# Patient Record
Sex: MALE | Race: WHITE
[De-identification: names, ages, dates, MRNs, and addresses within clinical notes are randomized per-mention and may not be internally consistent; named-entity substitution may affect disease eponyms.]

---

## 2018-03-05 ENCOUNTER — HOSPITAL ENCOUNTER (EMERGENCY)
Dept: HOSPITAL 41 - JD.ED | Age: 38
Discharge: HOME | End: 2018-03-05
Payer: COMMERCIAL

## 2018-03-05 VITALS — SYSTOLIC BLOOD PRESSURE: 124 MMHG | DIASTOLIC BLOOD PRESSURE: 89 MMHG

## 2018-03-05 DIAGNOSIS — Z87.891: ICD-10-CM

## 2018-03-05 DIAGNOSIS — J20.8: Primary | ICD-10-CM

## 2018-03-05 NOTE — EDM.PDOC
ED HPI GENERAL MEDICAL PROBLEM





- General


Chief Complaint: Respiratory Problem


Stated Complaint: SOB


Time Seen by Provider: 03/05/18 17:37


Source of Information: Reports: Patient


History Limitations: Reports: No Limitations





- History of Present Illness


INITIAL COMMENTS - FREE TEXT/NARRATIVE: 





The patient states that he has had a dry cough since Friday, 3/2/2018, and 

chest tightness with wheezing since Saturday, 3/3/2018. He notices that his 

symptoms are worse if he is lying down, better if he is upright. He has not had 

a fever, nausea, vomiting, constipation, diarrhea, chest pain, palpitations, or 

urinary symptoms.





The patient states that he has been taking leftover Tessalon Perles, with no 

relief.





The patient states that he has had similar symptoms in the past, but does not 

recall what the diagnosis was.





The patient does not have a PCP.








  ** Chest


Pain Score (Numeric/FACES): 1





- Related Data


 Allergies











Allergy/AdvReac Type Severity Reaction Status Date / Time


 


No Known Allergies Allergy   Verified 03/05/18 17:36











Home Meds: 


 Home Meds





. [No Known Home Meds]  03/05/18 [History]











Past Medical History





- Past Surgical History


HEENT Surgical History: Reports: Tonsillectomy (x 2)


GI Surgical History: Reports: Appendectomy


Musculoskeletal Surgical History: Reports: Arthroscopic Knee (Right x 3, left x 

2)


Dermatological Surgical History: Reports: Other (See Below) (Left wrist cyst 

excision x 5)





Social & Family History





- Tobacco Use


Smoking Status *Q: Former Smoker


Years of Tobacco use: 15


Packs/Tins Daily: 2


Second Hand Smoke Exposure: No





- Alcohol Use


Alcohol Use History: Yes


Days Per Week of Alcohol Use: 1


Number of Drinks Per Day: 2


Total Drinks Per Week: 2


Alcohol Use Frequency: Socially





- Recreational Drug Use


Recreational Drug Use: No





- Living Situation & Occupation


Living situation: Reports: , with Spouse, with Family (2 kids)


Occupation: Employed (Construction)





ED ROS GENERAL





- Review of Systems


Review Of Systems: ROS reveals no pertinent complaints other than HPI.





ED EXAM, GENERAL





- Physical Exam


Exam: See Below


Exam Limited By: No Limitations


General Appearance: Alert, WD/WN, No Apparent Distress


Eye Exam: Bilateral Eye: Normal Inspection


Ears: Normal External Exam, Hearing Grossly Normal


Nose: Normal Inspection, No Blood


Throat/Mouth: Normal Inspection, Normal Lips, Normal Voice, No Airway Compromise


Head: Atraumatic, Normocephalic


Neck: Normal Inspection, Full Range of Motion


Respiratory/Chest: No Respiratory Distress, No Accessory Muscle Use, Wheezing (

Expiratory, more pronounced with the patient supine, less pronounced with the 

patient upright).  No: Crackles, Rhonchi


Cardiovascular: Normal Peripheral Pulses, Regular Rate, Rhythm, No Gallop, No 

JVD, No Murmur, No Rub


Peripheral Pulses: 4+: Radial (L), Radial (R)


GI/Abdominal: Normal Bowel Sounds, Soft, Non-Tender, No Organomegaly, No 

Distention, No Abnormal Bruit, No Mass


 (Male) Exam: Deferred


Rectal (Males) Exam: Deferred


Back Exam: Normal Inspection, Full Range of Motion, NT


Extremities: Normal Inspection, Normal Range of Motion, No Pedal Edema, Normal 

Capillary Refill


Neurological: Alert, Oriented, Normal Cognition, No Motor/Sensory Deficits


Psychiatric: Normal Affect


Skin Exam: Warm, Dry, Intact, Normal Color, No Rash





Course





- Vital Signs


Last Recorded V/S: 





 Last Vital Signs











Temp  36.1 C   03/05/18 17:37


 


Pulse  88   03/05/18 17:37


 


Resp  18   03/05/18 17:37


 


BP  124/89   03/05/18 17:37


 


Pulse Ox  98   03/05/18 17:37














- Orders/Labs/Meds


Orders: 





 Active Orders 24 hr











 Category Date Time Status


 


 Chest 2V [CR] Stat Exams  03/05/18 18:11 Ordered














- Re-Assessments/Exams


Free Text/Narrative Re-Assessment/Exam: 





03/05/18 18:25


Two-view chest radiograph appears to be grossly normal.  Cardiac silhouette is 

within normal limits.  No pulmonary vascular congestion.  No pleural effusions.

  No focal infiltrate.  No pneumothorax.  Formal read per the Radiologist 

pending.








03/05/18 18:30


Chest x-ray results discussed with the patient. Clinically, he has acute viral 

bronchitis. I explained that, unfortunately, there are no medicines to treat 

bronchitis. Albuterol is only recommended to treat exacerbations of underlying 

lung disease, such as asthma or COPD, as a result of bronchitis, but not to 

treat bronchitis itself, and, in this case, the patient does not have 

underlying lung disease. Antitussive medications are not effective in the 

treatment of bronchitis.





Departure





- Departure


Time of Disposition: 18:32


Disposition: Home, Self-Care 01


Condition: Good


Clinical Impression: 


 Acute bronchitis, viral








- Discharge Information


Referrals: 


PCP,None [Primary Care Provider] - 


Edward Loyd [Physician] - 


Additional Instructions: 


You were seen in the emergency room for a dry cough and chest tightness with 

some wheezing.





Workup in the ER included a chest x-ray, which was normal.





Based on your history and physical examination, you are MOST LIKELY suffering 

from acute viral bronchitis.





Unfortunately, there are no medicines to treat viral bronchitis - it will have 

to run its course, which typically takes 3 weeks.





Also unfortunately, there are no medicines to treat the cough due to viral 

bronchitis, although you may be less symptomatic if you are upright.





Follow-up with Dr. Loyd in the clinic, as needed.





If any other problems, please do not hesitate to return to the ER.





- My Orders


Last 24 Hours: 





My Active Orders





03/05/18 18:11


Chest 2V [CR] Stat 














- Assessment/Plan


Last 24 Hours: 





My Active Orders





03/05/18 18:11


Chest 2V [CR] Stat

## 2018-03-06 NOTE — CR
Chest:  Two views of the chest were obtained.

 

Comparison: Prior chest x-ray of 04/21/14.

 

Heart size and mediastinum are normal.  Lungs are clear.  Bony 

structures are unremarkable.

 

Impression:

1.  Nothing acute is identified on two-view chest x-ray.

 

Diagnostic code #1

## 2020-10-10 NOTE — EDM.PDOC
ED HPI GENERAL MEDICAL PROBLEM





- General


Chief Complaint: Skin Complaint


Stated Complaint: POSS CYST ON BOTTOM


Time Seen by Provider: 10/10/20 07:06


Source of Information: Reports: Patient


History Limitations: Reports: No Limitations





- History of Present Illness


INITIAL COMMENTS - FREE TEXT/NARRATIVE: 





40-year-old male presents the ED with a painful swollen lesion around his anus. 

States it started yesterday and is progressively intensified in degree of pain. 

It is constant and throbbing.  Painful to sit and walk.  He reports his bowel 

function has been normal with no constipation or diarrhea.  He has not noticed 

any drainage or blood from the lesion.  He has had problems with external 

hemorrhoids in the past.  He does not feel systemically ill.  No fever no chills

no nausea or vomiting.


Onset: Gradual


Onset Date: 10/09/20


Duration: Hour(s):, Getting Worse


Location: Reports: Other (Painful swollen lesion perianally)


Quality: Reports: Ache, Throbbing


Severity: Moderate


Improves with: Reports: None


Worsens with: Reports: Other


Context: Denies: Activity (Walking and sitting.), Exercise, Lifting, Sick 

Contact, Trauma


Associated Symptoms: Denies: Confusion, Chest Pain, Cough, cough w sputum, 

Fever/Chills, Headaches, Loss of Appetite, Malaise, Nausea/Vomiting, Rash, 

Seizure, Syncope


Treatments PTA: Reports: Other (see below) (None.)


  ** Rectal


Pain Score (Numeric/FACES): 3





- Related Data


                                    Allergies











Allergy/AdvReac Type Severity Reaction Status Date / Time


 


No Known Allergies Allergy   Verified 10/10/20 05:22











Home Meds: 


                                    Home Meds





. [No Known Home Meds]  03/05/18 [History]











Past Medical History





- Past Health History


Medical/Surgical History: Denies Medical/Surgical History





- Past Surgical History


HEENT Surgical History: Reports: Tonsillectomy (x 2)


GI Surgical History: Reports: Appendectomy


Musculoskeletal Surgical History: Reports: Arthroscopic Knee (Right x 3, left x 

2)


Dermatological Surgical History: Reports: Other (See Below) (Left wrist cyst 

excision x 5)





Social & Family History





- Living Situation & Occupation


Living situation: Reports: , with Spouse, with Family (2 kids)


Occupation: Employed (Construction)





ED ROS GENERAL





- Review of Systems


Review Of Systems: See Below


Constitutional: Denies: Fever, Chills, Malaise, Weakness, Fatigue, Decreased 

Appetite, Weight Loss


HEENT: Reports: Glasses


Respiratory: Reports: No Symptoms


Cardiovascular: Reports: No Symptoms


Endocrine: Reports: No Symptoms


GI/Abdominal: Reports: No Symptoms


: Reports: No Symptoms


Musculoskeletal: Reports: No Symptoms


Skin: Reports: Other (Painful swollen area perianally.)


Psychiatric: Reports: No Symptoms


Hematologic/Lymphatic: Reports: No Symptoms


Immunologic: Reports: No Symptoms





ED EXAM, SKIN/RASH


Exam: See Below


Exam Limited By: No Limitations


General Appearance: Alert, WD/WN, No Apparent Distress, Other


Eye Exam: Bilateral Eye: Normal Inspection, PERRL


Throat/Mouth: Normal Inspection, Normal Lips, Normal Oropharynx


Respiratory/Chest: No Respiratory Distress, Lungs Clear, Normal Breath Sounds, 

No Accessory Muscle Use, Chest Non-Tender


Peripheral Pulses: 3+: Posterior Tibial (L), Posterior Tibial (R), Dorsalis 

Pedis (L), Dorsalis Pedis (R)


GI/Abdominal: Normal Bowel Sounds, Soft, Non-Tender, No Organomegaly, No Mass, 

Pelvis Stable


Rectal (Males) Exam: Other (Painful mass left side of the perianal area at 6:00 

to 8 o'clock position if he was lying on his back.  Peers to be a thrombosed 

hemorrhoid.  No purulent drainage.  Moderately tender to touch.  No erythema)


Back Exam: Normal Inspection, Full Range of Motion


Extremities: Normal Inspection, Normal Range of Motion, Non-Tender


Neurological: Alert, Oriented, CN II-XII Intact, Normal Cognition





ED SKIN PROCEDURES





- I&D


Site: External thrombosed hemorrhoid


Skin Prep: Chlorhexidine (Hibiciens)


Local Anesthesia: Lidocaine: 1% with EPI


Local Anesthetic Volume: 2cc


Area Incised With: 15 Blade


Drainage: Bloody, Large Amount, Other (Several clots removed from the thrombosed

 hemorrhoid.)


Probed to Break Up Loculations: No


Sterile Dressing: Adhesive Dressing, 4x4(s)





Course





- Vital Signs


Last Recorded V/S: 


                                Last Vital Signs











Temp  36.7 C   10/10/20 05:18


 


Pulse  66   10/10/20 05:18


 


Resp  16   10/10/20 05:18


 


BP  123/91 H  10/10/20 05:18


 


Pulse Ox  97   10/10/20 05:18














- Orders/Labs/Meds


Meds: 


Medications














Discontinued Medications














Generic Name Dose Route Start Last Admin





  Trade Name Freq  PRN Reason Stop Dose Admin


 


Lidocaine/Epinephrine  20 ml  10/10/20 07:11  10/10/20 07:21





  Xylocaine 1% With Epinephrine 1:100,000  INJECT  10/10/20 07:12  20 ml





  ONETIME ONE   Administration


 


Witch Hazel  2 pad  10/10/20 07:36  10/10/20 07:56





  Tucks  TOP  10/10/20 07:37  2 applic





  ASDIRECTED STA   Administration














- Radiology Interpretation


Free Text/Narrative:: 


40-year-old male presents to the ED with a painful swollen area along his anus. 

 States it popped up over the last 24 hours.  Has a history of recurrent 

external thrombosed hemorrhoids but this feels different.  He has no systemic 

signs of illness.  Examination reveals a swelling approximately 1.5 cm x 1 cm at

 the 6:00 to 8 o'clock position perianally if he was lying on his back.  Appears

 to be a thrombosed hemorrhoid is there is a bluish discoloration in the middle 

of the lesion without erythema.  Plan will be to anesthetize the area with 1% 

lidocaine with epinephrine and open up and drain it and remove the clot.








- Re-Assessments/Exams


Free Text/Narrative Re-Assessment/Exam: 





10/10/20 07:40 thrombosed external hemorrhoid was anesthetized with 1% lidocaine

 with epinephrine.  A 1.2 cm incision was made into the hemorrhoid which was 

about the size of a grape.  Removed over a large clots from the area.  Plan will

 be to have sitz bath 4 times daily for the next 2 days.  Cover the area which 

bernie patches and 4 x 4's until it heals.








Departure





- Departure


Time of Disposition: 07:41


Disposition: Home, Self-Care 01


Condition: Fair


Clinical Impression: 


 Thrombosed external hemorrhoid, Encounter for incision and drainage procedure








- Discharge Information


*PRESCRIPTION DRUG MONITORING PROGRAM REVIEWED*: Not Applicable


*COPY OF PRESCRIPTION DRUG MONITORING REPORT IN PATIENT ANGIE: Not Applicable


Instructions:  Hemorrhoids, Easy-to-Read


Referrals: 


PCP,None [Primary Care Provider] - 


Forms:  ED Department Discharge


Additional Instructions: 


Evaluation in the emergency room this morning in regards to a painful swelling 

that developed perianally over the last 24 hours.  Exam reveals this to be a 

thrombosed external hemorrhoid almost the size of a grape.  Decision made to 

incise and drain the hemorrhoid under local anesthetic which was performed.  

Remove several large clots from the hemorrhoid.  Treatment at home is to sit in 

his hot water as you can  the bathtub for 5 to 10 minutes 4 times a day 

and tomorrow squeezed the area after you have been in the bathtub for about 5 

minutes to milk any further blood clots out of the wound or keep them for 

reforming.  Then apply witch hazel patches folded over top of the hemorrhoid and

4 x 4 dressings.  After 2 days you should not require any further dressings to 

the area.  Return to medical care if any further problems occur.





Sepsis Event Note (ED)





- Evaluation


Sepsis Screening Result: No Definite Risk





- Focused Exam


Vital Signs: 


                                   Vital Signs











  Temp Pulse Resp BP Pulse Ox


 


 10/10/20 05:18  36.7 C  66  16  123/91 H  97